# Patient Record
Sex: MALE | Race: WHITE | NOT HISPANIC OR LATINO | URBAN - METROPOLITAN AREA
[De-identification: names, ages, dates, MRNs, and addresses within clinical notes are randomized per-mention and may not be internally consistent; named-entity substitution may affect disease eponyms.]

---

## 2018-07-30 ENCOUNTER — TRANSCRIBE ORDERS (OUTPATIENT)
Dept: PHYSICAL THERAPY | Facility: HOSPITAL | Age: 23
End: 2018-07-30

## 2018-07-30 ENCOUNTER — HOSPITAL ENCOUNTER (OUTPATIENT)
Dept: PHYSICAL THERAPY | Facility: HOSPITAL | Age: 23
Setting detail: THERAPIES SERIES
Discharge: HOME | End: 2018-07-30

## 2018-07-30 DIAGNOSIS — S62.111A CLOSED CHIP FRACTURE OF TRIQUETRAL BONE OF RIGHT WRIST, INITIAL ENCOUNTER: Primary | ICD-10-CM

## 2018-07-30 DIAGNOSIS — S42.412A LEFT SUPRACONDYLAR HUMERUS FRACTURE, CLOSED, INITIAL ENCOUNTER: ICD-10-CM

## 2018-07-30 DIAGNOSIS — S62.111A TRIQUETRAL CHIP FRACTURE, RIGHT, CLOSED, INITIAL ENCOUNTER: Primary | ICD-10-CM

## 2018-07-30 DIAGNOSIS — S92.902A FOOT FRACTURE, LEFT, CLOSED, INITIAL ENCOUNTER: ICD-10-CM

## 2018-07-30 DIAGNOSIS — S92.812A OTHER FRACTURE OF LEFT FOOT, INITIAL ENCOUNTER FOR CLOSED FRACTURE: ICD-10-CM

## 2018-07-30 DIAGNOSIS — S42.412A CLOSED SUPRACONDYLAR FRACTURE OF LEFT HUMERUS, INITIAL ENCOUNTER: ICD-10-CM

## 2018-07-30 DIAGNOSIS — Z74.09 IMPAIRED FUNCTIONAL MOBILITY, BALANCE, GAIT, AND ENDURANCE: ICD-10-CM

## 2018-07-30 PROCEDURE — 97162 PT EVAL MOD COMPLEX 30 MIN: CPT | Performed by: PHYSICAL THERAPIST
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
## 2018-08-24 ENCOUNTER — HOSPITAL ENCOUNTER (OUTPATIENT)
Dept: PHYSICAL THERAPY | Facility: HOSPITAL | Age: 23
Setting detail: THERAPIES SERIES
Discharge: HOME | End: 2018-08-24

## 2018-08-24 ENCOUNTER — TRANSCRIBE ORDERS (OUTPATIENT)
Dept: PHYSICAL THERAPY | Facility: HOSPITAL | Age: 23
End: 2018-08-24

## 2018-08-24 DIAGNOSIS — S69.91XA INJURY OF RIGHT WRIST, INITIAL ENCOUNTER: Primary | ICD-10-CM

## 2018-08-24 DIAGNOSIS — S62.111A CLOSED CHIP FRACTURE OF TRIQUETRAL BONE OF RIGHT WRIST, INITIAL ENCOUNTER: Primary | ICD-10-CM

## 2018-08-24 DIAGNOSIS — Z74.09 IMPAIRED FUNCTIONAL MOBILITY, BALANCE, GAIT, AND ENDURANCE: ICD-10-CM

## 2018-08-24 DIAGNOSIS — S42.412A CLOSED SUPRACONDYLAR FRACTURE OF LEFT HUMERUS, INITIAL ENCOUNTER: ICD-10-CM

## 2018-08-24 DIAGNOSIS — S92.812A OTHER FRACTURE OF LEFT FOOT, INITIAL ENCOUNTER FOR CLOSED FRACTURE: ICD-10-CM

## 2018-08-24 DIAGNOSIS — S92.902A CLOSED FRACTURE OF LEFT FOOT, INITIAL ENCOUNTER: ICD-10-CM

## 2018-08-24 PROCEDURE — 97110 THERAPEUTIC EXERCISES: CPT | Performed by: PHYSICAL THERAPIST

## 2018-08-24 NOTE — THERAPY PROGRESS REPORT/RE-CERT
Outpatient Physical Therapy Ortho Re-Assessment  [facility]     Patient Name: Edwar Gamboa  : 1995  MRN: [id number]  Today's Date: 2018      Visit Date: 2018    There is no problem list on file for this patient.       No past medical history on file.     No past surgical history on file.    Visit Dx:     ICD-10-CM ICD-9-CM   1. Closed chip fracture of triquetral bone of right wrist, initial encounter S62.111A 814.03   2. Other fracture of left foot, initial encounter for closed fracture S92.812A 825.20   3. Closed supracondylar fracture of left humerus, initial encounter S42.412A 812.41   4. Impaired functional mobility, balance, gait, and endurance Z74.09 V49.89                 PT Ortho     Row Name 18 1100       Subjective Comments    Subjective Comments Pt reports he f/u with ortho MD at , Dr. Durand, and given new order for PT that included pendulums for LUE only, NWB BUE, boot for L ankle. PT left message for MD office for copy of most recent appt note.   -CP       Precautions and Contraindications    Precautions NWB BUE, WBAT LLE  -CP       Subjective Pain    Pre-Treatment Pain Level 6   ankle pain 6/10, right wrist 5/10; left shoulder 5/10.   -CP       Posture/Observations    Posture/Observations Comments Pt ambulated into clinic with walking boot LLE, right wrist cock up splint.   -CP       Left Lower Ext    Lt Ankle Dorsiflexion AROM 7  -CP    Lt Ankle Plantarflexion AROM 24  -CP    Lt Ankle Inversion AROM 25%  -CP    Lt Ankle Eversion AROM 25%  -CP       Left Hip (Manual Muscle Testing)    Left Hip Manual Muscle Testing (MMT) flexion;abduction;adduction  -CP    MMT: Flexion, Left Hip flexion  -CP    MMT, Gross Movement: Left Hip Flexion (5/5) normal  -CP    MMT: ABduction, Left Hip abduction  -CP    MMT, Gross Movement: Left Hip ABduction (4+/5) good plus  -CP    MMT, Gross Movement: Left Hip ADduction (5/5) normal  -CP       Right Hip (Manual Muscle Testing)    Right Hip  Manual Muscle Testing (MMT) flexion;abduction;adduction  -CP    MMT: Flexion, Right Hip flexion  -CP    MMT, Gross Movement: Right Hip Flexion (5/5) normal  -CP    MMT: ABduction, Right Hip abduction  -CP    MMT, Gross Movement: Right Hip ABduction (5/5) normal  -CP    MMT, Gross Movement: Right Hip ADduction (5/5) normal  -CP       Left Knee (Manual Muscle Testing)    Left Knee Manual Muscle Testing (MMT) extension;flexion  -CP    MMT: Extension, Left Knee extension  -CP    MMT, Gross Movement: Left Knee Extension (4+/5) good plus  -CP    MMT: Flexion, Left Knee flexion  -CP    MMT, Gross Movement: Left Knee Flexion (4+/5) good plus  -CP       Right Knee (Manual Muscle Testing)    Right Knee Manual Muscle Testing (MMT) extension;flexion  -CP    MMT: Extension, Right Knee extension  -CP    MMT, Gross Movement: Right Knee Extension (5/5) normal  -CP    MMT: Flexion, Right Knee flexion  -CP    MMT, Gross Movement: Right Knee Flexion (5/5) normal  -CP       Left Ankle/Foot (Manual Muscle Testing)    MMT: Plantarflexion, Left Ankle plantarflexion  -CP    MMT, Gross Movement: Left Ankle Plantarflexion (3-/5) fair minus  -CP    MMT: Dorsiflexion Left Ankle Muscles dorsiflexion  -CP    MMT, Gross Movement: Left Ankle Dorsiflexion (3-/5) fair minus  -CP    MMT: Inversion, Left Ankle subtalar inversion  -CP    MMT, Gross Movement: Left Ankle Subtalar Inversion (3-/5) fair minus  -CP    MMT: Eversion, Left Ankle subtalar eversion  -CP    MMT, Gross Movement: Left Ankle Subtalar Eversion (3-/5) fair minus  -CP    Comment, MMT: Left Ankle/Foot MMT not formally tested d/t fracture; NWB LLE  -CP       Right Ankle/Foot (Manual Muscle Testing)    Right Ankle Manual Muscle Testing (MMT) plantarflexion;dorsiflexion  -CP    MMT: Plantarflexion, Right Ankle plantarflexion  -CP    MMT, Gross Movement: Right Ankle Plantarflexion (4+/5) good plus  -CP    MMT: Dorsiflexion, Right Ankle Muscles dorsiflexion  -CP    MMT, Gross Movement:  "Right Ankle Dorsiflexion (5/5) normal  -CP      User Key  (r) = Recorded By, (t) = Taken By, (c) = Cosigned By    Initials Name Provider Type    CP Perkins, Corinne E, PT Physical Therapist                                  PT OP Goals     Row Name 08/24/18 1100          PT Short Term Goals    STG Date to Achieve 09/07/18  -CP     STG 1 Patient is independent with HEP for flexibility and strengthening.  -CP     STG 1 Progress Ongoing  -CP     STG 2 Patient reports foot/ankle pain is reduced by at least 25%.  -CP     STG 2 Progress Ongoing  -CP     STG 3 Pt will report resting pain 0-1/10, 2-3/10 with PROM left shoulder.   -CP     STG 3 Progress Ongoing  -CP        Long Term Goals    LTG Date to Achieve 09/23/18  -CP     LTG 1 LEFS score is improved by at least 10 points to demonstrated improved functional mobility.  -CP     LTG 1 Progress Ongoing  -CP     LTG 2 Patient reports of ability to walk up/down steps with reciprocal pattern.  -CP     LTG 2 Progress Ongoing  -CP     LTG 3 Patient will improve Quick Dash score by 15 points to demonstrate improved function of daily tasks.  -CP     LTG 3 Progress Ongoing  -CP        Time Calculation    PT Goal Re-Cert Due Date 10/28/18  -CP       User Key  (r) = Recorded By, (t) = Taken By, (c) = Cosigned By    Initials Name Provider Type    CP Perkins, Corinne E, PT Physical Therapist                PT Assessment/Plan     Row Name 08/24/18 1100          PT Assessment    Functional Limitations Decreased safety during functional activities;Impaired locomotion;Impaired gait;Limitation in home management;Limitations in community activities;Performance in leisure activities;Performance in work activities  -CP     Impairments Range of motion;Poor body mechanics;Pain;Muscle strength;Joint mobility  -CP     Assessment Comments Pt had f/u with ortho [facility], given new PT order for right wrist fracture, left foot fracture, \"no ROM or strengthening of RUE, LLE, WBAT in LLE, NWB bilat UE.\" " Pt verbalized indep with current HEP within restrictions and educated on importance of wearing R wrist cock up splint at all times and LLE boot. Pt educated on proper technique for LUE pendulums, pain mgmt, and progression of PT POC.   -CP     Please refer to paper survey for additional self-reported information Yes  -CP     Rehab Potential Good  -CP     Patient/caregiver participated in establishment of treatment plan and goals Yes  -CP     Patient would benefit from skilled therapy intervention Yes  -CP        PT Plan    PT Frequency 1x/week  -CP     Predicted Duration of Therapy Intervention (Therapy Eval) 8-10 visits after MD appt Sept 20th.   -CP     Planned CPT's? PT EVAL LOW COMPLEXITY: 10195;PT RE-EVAL: 25153;PT THER PROC EA 15 MIN: 16572;PT MANUAL THERAPY EA 15 MIN: 49970;PT NEUROMUSC RE-EDUCATION EA 15 MIN: 21658;PT GAIT TRAINING EA 15 MIN: 51308;PT ELECTRICAL STIM UNATTEND: ;PT ULTRASOUND EA 15 MIN: 73493;PT HOT/COLD PACK WC NONMCARE: 58516;PT IONTOPHORESIS EA 15 MIN: 71416  -CP     PT Plan Comments Progress in clinic with ROM, strengthening, and improved functional mobility next visit after MD giraldo. Will conserve PT visits until that time to allow time for fracture to heal.   -CP       User Key  (r) = Recorded By, (t) = Taken By, (c) = Cosigned By    Initials Name Provider Type    CP Perkins, Corinne E, PT Physical Therapist                  Exercises     Row Name 08/24/18 1100             Subjective Comments    Subjective Comments Pt reports he f/u with ortho MD at , Dr. Durand, and given new order for PT that included pendulums for LUE only, NWB BUE, boot for L ankle. PT left message for MD office for copy of most recent appt note.   -CP         Subjective Pain    Able to rate subjective pain? yes  -CP      Pre-Treatment Pain Level 6   ankle pain 6/10, right wrist 5/10; left shoulder 5/10.   -CP      Post-Treatment Pain Level 6  -CP         Total Minutes    39778 - PT Therapeutic Exercise  Minutes 38  -CP         Exercise 1    Exercise Name 1 Shoulder pendulums for LUE  -CP      Cueing 1 Verbal;Demo  -CP      Reps 1 15x  -CP      Additional Comments educated to weight bear on R elbow.   -CP         Exercise 2    Exercise Name 2 Reassessment completed this date in clinic.   -CP        User Key  (r) = Recorded By, (t) = Taken By, (c) = Cosigned By    Initials Name Provider Type    CP Perkins, Corinne E, PT Physical Therapist                        Outcome Measure Options: Lower Extremity Functional Scale (LEFS), Quick DASH  Quick DASH  Open a tight or new jar.: Unable  Do heavy household chores (e.g., wash walls, wash floors): Severe Difficulty  Carry a shopping bag or briefcase: Mild Difficulty  Wash your back: Moderate Difficulty  Use a knife to cut food: Moderate Difficulty  Recreational activities in which you take some force or impact through your arm, should or hand (e.g. golf, hammering, tennis, etc.): Unable  During the past week, to what extent has your arm, shoulder, or hand problem interfered with your normal social activites with family, friends, neighbors or groups?: Quite a bit  During the past week, were you limited in your work or other regular daily activities as a result of your arm, shoulder or hand problem?: Very limited  Arm, Shoulder, or hand pain: Severe  Tingling (pins and needles) in your arm, shoulder, or hand: Moderate  During the past week, how much difficulty have you had sleeping because of the pain in your arm, shoulder or hand?: Moderate Difficiculty  Number of Questions Answered: 11  Quick DASH Score: 65.91  Lower Extremity Functional Index  Any of your usual work, housework or school activities: Quite a bit of difficulty  Your usual hobbies, recreational or sporting activities: Extreme difficulty or unable to perform activity  Getting into or out of the bath: Moderate difficulty  Walking between rooms: Moderate difficulty  Putting on your shoes or socks: A little bit of  difficulty  Squatting: Quite a bit of difficulty  Lifting an object, like a bag of groceries from the floor: Moderate difficulty  Performing light activities around your home: Moderate difficulty  Performing heavy activities around your home: Extreme difficulty or unable to perform activity  Getting into or out of a car: Moderate difficulty  Walking 2 blocks: Moderate difficulty  Walking a mile: Extreme difficulty or unable to perform activity  Going up or down 10 stairs (about 1 flight of stairs): Moderate difficulty  Standing for 1 hour: Quite a bit of difficulty  Sitting for 1 hour: A little bit of difficulty  Running on even ground: Extreme difficulty or unable to perform activity  Running on uneven ground: Extreme difficulty or unable to perform activity  Making sharp turns while running fast: Extreme difficulty or unable to perform activity  Hopping: A little bit of difficulty  Rolling over in bed: A little bit of difficulty  Total: 29      Time Calculation:     Therapy Suggested Charges     Code   Minutes Charges    45226 (CPT®) Hc Pt Neuromusc Re Education Ea 15 Min      22222 (CPT®) Hc Pt Ther Proc Ea 15 Min 38 3    16524 (CPT®) Hc Gait Training Ea 15 Min      21211 (CPT®) Hc Pt Therapeutic Act Ea 15 Min      21332 (CPT®) Hc Pt Manual Therapy Ea 15 Min      86521 (CPT®) Hc Pt Ther Massage- Per 15 Min      70227 (CPT®) Hc Pt Iontophoresis Ea 15 Min      77291 (CPT®) Hc Pt Elec Stim Ea-Per 15 Min      20253 (CPT®) Hc Pt Ultrasound Ea 15 Min      51991 (CPT®) Hc Pt Self Care/Mgmt/Train Ea 15 Min      30127 (CPT®) Hc Pt Prosthetic (S) Train Initial Encounter, Each 15 Min      23267 (CPT®) Hc Orthotic(S) Mgmt/Train Initial Encounter, Each 15min      16854 (CPT®) Hc Pt Aquatic Therapy Ea 15 Min      46943 (CPT®) Hc Pt Orthotic(S)/Prosthetic(S) Encounter, Each 15 Min      Total  38 3          Start Time: 1115  Total Timed Code Minutes- PT: 38 minute(s)     Therapy Charges for Today     Code Description Service  Date Service Provider Modifiers Qty    88433450828 HC PT THER PROC EA 15 MIN 8/24/2018 Perkins, Corinne E, PT GP 3          PT G-Codes  Outcome Measure Options: Lower Extremity Functional Scale (LEFS), Quick DASH         Corinne E. Perkins, PT  8/24/2018

## 2018-09-27 ENCOUNTER — DOCUMENTATION (OUTPATIENT)
Dept: PHYSICAL THERAPY | Facility: HOSPITAL | Age: 23
End: 2018-09-27

## 2018-09-27 DIAGNOSIS — S42.412A CLOSED SUPRACONDYLAR FRACTURE OF LEFT HUMERUS, INITIAL ENCOUNTER: ICD-10-CM

## 2018-09-27 DIAGNOSIS — S62.111A CLOSED CHIP FRACTURE OF TRIQUETRAL BONE OF RIGHT WRIST, INITIAL ENCOUNTER: Primary | ICD-10-CM

## 2018-09-27 DIAGNOSIS — S92.812A OTHER FRACTURE OF LEFT FOOT, INITIAL ENCOUNTER FOR CLOSED FRACTURE: ICD-10-CM

## 2018-09-27 DIAGNOSIS — Z74.09 IMPAIRED FUNCTIONAL MOBILITY, BALANCE, GAIT, AND ENDURANCE: ICD-10-CM

## 2018-09-27 NOTE — THERAPY DISCHARGE NOTE
Outpatient Physical Therapy Discharge Summary         Patient Name: Edwar Gamboa  : 1995  MRN: [id number]    Today's Date: 2018    Visit Dx:    ICD-10-CM ICD-9-CM   1. Closed chip fracture of triquetral bone of right wrist, initial encounter S62.111A 814.03   2. Other fracture of left foot, initial encounter for closed fracture S92.812A 825.20   3. Closed supracondylar fracture of left humerus, initial encounter S42.412A 812.41   4. Impaired functional mobility, balance, gait, and endurance Z74.09 V49.89             PT OP Goals     Row Name 18 1400          PT Short Term Goals    STG Date to Achieve 18  -CP     STG 1 Patient is independent with HEP for flexibility and strengthening.  -CP     STG 1 Progress Met  -CP     STG 2 Patient reports foot/ankle pain is reduced by at least 25%.  -CP     STG 2 Progress Not Met  -CP     STG 3 Pt will report resting pain 0-1/10, 2-3/10 with PROM left shoulder.   -CP     STG 3 Progress Not Met  -CP        Long Term Goals    LTG Date to Achieve 18  -CP     LTG 1 LEFS score is improved by at least 10 points to demonstrated improved functional mobility.  -CP     LTG 1 Progress Not Met  -CP     LTG 2 Patient reports of ability to walk up/down steps with reciprocal pattern.  -CP     LTG 2 Progress Not Met  -CP     LTG 3 Patient will improve Quick Dash score by 15 points to demonstrate improved function of daily tasks.  -CP     LTG 3 Progress Not Met  -CP       User Key  (r) = Recorded By, (t) = Taken By, (c) = Cosigned By    Initials Name Provider Type    CP Perkins, Corinne E, PT Physical Therapist          OP PT Discharge Summary  Date of Discharge: 18  Reason for Discharge: Non-compliant, Unable to participate  Outcomes Achieved: Unable to make functional progress toward goals at this time  Discharge Destination: Home with home program  Discharge Instructions/Additional Comments: Patient was seen for PT IE and 1 treatment only since  08/24/2018. Due to lack of compliance and f/u with therapy as expected, pt will be d/c from OPPT.       Time Calculation:        Therapy Suggested Charges     Code   Minutes Charges    None                       Corinne E. Perkins, PT  9/27/2018